# Patient Record
Sex: FEMALE | Race: WHITE | NOT HISPANIC OR LATINO | Employment: UNEMPLOYED | URBAN - METROPOLITAN AREA
[De-identification: names, ages, dates, MRNs, and addresses within clinical notes are randomized per-mention and may not be internally consistent; named-entity substitution may affect disease eponyms.]

---

## 2021-06-03 ENCOUNTER — RECORDS - HEALTHEAST (OUTPATIENT)
Dept: ADMINISTRATIVE | Facility: CLINIC | Age: 31
End: 2021-06-03

## 2022-03-05 ENCOUNTER — HOSPITAL ENCOUNTER (EMERGENCY)
Dept: HOSPITAL 47 - EC | Age: 32
LOS: 1 days | Discharge: HOME | End: 2022-03-06
Payer: COMMERCIAL

## 2022-03-05 VITALS — TEMPERATURE: 99.8 F

## 2022-03-05 DIAGNOSIS — S00.03XA: ICD-10-CM

## 2022-03-05 DIAGNOSIS — S00.83XA: ICD-10-CM

## 2022-03-05 DIAGNOSIS — F17.200: ICD-10-CM

## 2022-03-05 DIAGNOSIS — S62.102A: Primary | ICD-10-CM

## 2022-03-05 DIAGNOSIS — Z88.5: ICD-10-CM
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
## 2022-03-05 DIAGNOSIS — Z91.018: ICD-10-CM

## 2022-03-05 LAB
ALBUMIN SERPL-MCNC: 3.7 G/DL (ref 3.5–5)
ALP SERPL-CCNC: 103 U/L (ref 38–126)
ALT SERPL-CCNC: 51 U/L (ref 4–34)
ANION GAP SERPL CALC-SCNC: 10 MMOL/L
APTT BLD: 19.3 SEC (ref 22–30)
AST SERPL-CCNC: 45 U/L (ref 14–36)
BASOPHILS # BLD AUTO: 0 K/UL (ref 0–0.2)
BASOPHILS NFR BLD AUTO: 0 %
BUN SERPL-SCNC: 16 MG/DL (ref 7–17)
CALCIUM SPEC-MCNC: 9.1 MG/DL (ref 8.4–10.2)
CHLORIDE SERPL-SCNC: 104 MMOL/L (ref 98–107)
CO2 SERPL-SCNC: 26 MMOL/L (ref 22–30)
EOSINOPHIL # BLD AUTO: 0 K/UL (ref 0–0.7)
EOSINOPHIL NFR BLD AUTO: 0 %
ERYTHROCYTE [DISTWIDTH] IN BLOOD BY AUTOMATED COUNT: 4.53 M/UL (ref 3.8–5.4)
ERYTHROCYTE [DISTWIDTH] IN BLOOD: 13.6 % (ref 11.5–15.5)
GLUCOSE SERPL-MCNC: 114 MG/DL (ref 74–99)
HCT VFR BLD AUTO: 41.5 % (ref 34–46)
HGB BLD-MCNC: 14 GM/DL (ref 11.4–16)
INR PPP: 0.9 (ref ?–1.2)
LYMPHOCYTES # SPEC AUTO: 1.5 K/UL (ref 1–4.8)
LYMPHOCYTES NFR SPEC AUTO: 15 %
MCH RBC QN AUTO: 30.9 PG (ref 25–35)
MCHC RBC AUTO-ENTMCNC: 33.7 G/DL (ref 31–37)
MCV RBC AUTO: 91.7 FL (ref 80–100)
MONOCYTES # BLD AUTO: 0.3 K/UL (ref 0–1)
MONOCYTES NFR BLD AUTO: 3 %
NEUTROPHILS # BLD AUTO: 7.7 K/UL (ref 1.3–7.7)
NEUTROPHILS NFR BLD AUTO: 79 %
PLATELET # BLD AUTO: 371 K/UL (ref 150–450)
POTASSIUM SERPL-SCNC: 3.7 MMOL/L (ref 3.5–5.1)
PROT SERPL-MCNC: 7.1 G/DL (ref 6.3–8.2)
PT BLD: 10.1 SEC (ref 9–12)
SODIUM SERPL-SCNC: 140 MMOL/L (ref 137–145)
WBC # BLD AUTO: 9.7 K/UL (ref 3.8–10.6)

## 2022-03-05 PROCEDURE — 85730 THROMBOPLASTIN TIME PARTIAL: CPT

## 2022-03-05 PROCEDURE — 83605 ASSAY OF LACTIC ACID: CPT

## 2022-03-05 PROCEDURE — 85610 PROTHROMBIN TIME: CPT

## 2022-03-05 PROCEDURE — 96375 TX/PRO/DX INJ NEW DRUG ADDON: CPT

## 2022-03-05 PROCEDURE — 87040 BLOOD CULTURE FOR BACTERIA: CPT

## 2022-03-05 PROCEDURE — 71111 X-RAY EXAM RIBS/CHEST4/> VWS: CPT

## 2022-03-05 PROCEDURE — 85025 COMPLETE CBC W/AUTO DIFF WBC: CPT

## 2022-03-05 PROCEDURE — 80053 COMPREHEN METABOLIC PANEL: CPT

## 2022-03-05 PROCEDURE — 36415 COLL VENOUS BLD VENIPUNCTURE: CPT

## 2022-03-05 PROCEDURE — 99283 EMERGENCY DEPT VISIT LOW MDM: CPT

## 2022-03-05 PROCEDURE — 73110 X-RAY EXAM OF WRIST: CPT

## 2022-03-05 PROCEDURE — 73130 X-RAY EXAM OF HAND: CPT

## 2022-03-05 PROCEDURE — 29125 APPL SHORT ARM SPLINT STATIC: CPT

## 2022-03-05 PROCEDURE — 96374 THER/PROPH/DIAG INJ IV PUSH: CPT

## 2022-03-05 NOTE — XR
EXAMINATION TYPE: XR hand complete LT

 

DATE OF EXAM: 3/5/2022

 

COMPARISON: NONE

 

HISTORY: Pain

 

TECHNIQUE: 3 views

 

FINDINGS: 3 views are obtained through the cast that show transverse fractures of the mid shaft of th
e second and third metacarpals. This probably also nondisplaced fracture midshaft fourth metacarpal. 
It is 100% offset of the third metacarpal fracture. Detail limited by the cast. The fingers appear in
tact.

 

IMPRESSION: Multiple metacarpal fractures with variable displacement. The main exam.

## 2022-03-05 NOTE — XR
EXAMINATION TYPE: XR ribs bilat w pa chest xray

 

DATE OF EXAM: 3/5/2022

 

COMPARISON: NONE

 

HISTORY: Pain. Chest pain. Contusion.

 

TECHNIQUE: 9 views

 

FINDINGS: Heart and mediastinum are normal. Lungs are clear of infiltrate. There is no pleural effusi
on or pneumothorax. Trachea is midline.

 

The ribs appear intact. No evidence of rib fracture.

 

IMPRESSION: Normal chest. Normal bilateral rib exam.

## 2022-03-05 NOTE — XR
EXAMINATION TYPE: XR wrist complete LT

 

DATE OF EXAM: 3/5/2022

 

COMPARISON: NONE

 

HISTORY: Pain

 

TECHNIQUE: 3 views

 

FINDINGS: 3 views of the cast were obtained that show transverse fractures of mid shaft of the second
 third and fourth metacarpals. There is variable displacement up to 100% offset. The carpal bones are
 intact. There is nondisplaced ulnar styloid process fracture. Distal radius appears intact. Detail l
imited by the cast.

 

IMPRESSION: Ulnar styloid process fracture. Multiple metacarpal fractures. Limited exam.
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
## 2022-03-06 VITALS — DIASTOLIC BLOOD PRESSURE: 80 MMHG | SYSTOLIC BLOOD PRESSURE: 131 MMHG | HEART RATE: 85 BPM

## 2022-03-06 VITALS — RESPIRATION RATE: 18 BRPM

## 2023-11-07 ENCOUNTER — HOSPITAL ENCOUNTER (EMERGENCY)
Facility: HOSPITAL | Age: 33
Discharge: HOME | End: 2023-11-07
Attending: FAMILY MEDICINE | Admitting: FAMILY MEDICINE
Payer: COMMERCIAL

## 2023-11-07 VITALS
TEMPERATURE: 98.1 F | DIASTOLIC BLOOD PRESSURE: 76 MMHG | HEART RATE: 102 BPM | SYSTOLIC BLOOD PRESSURE: 138 MMHG | RESPIRATION RATE: 20 BRPM | OXYGEN SATURATION: 100 %

## 2023-11-07 DIAGNOSIS — L08.9 INFECTED INSECT BITE OF RIGHT LOWER EXTREMITY, INITIAL ENCOUNTER: ICD-10-CM

## 2023-11-07 DIAGNOSIS — S80.861A INFECTED INSECT BITE OF RIGHT LOWER EXTREMITY, INITIAL ENCOUNTER: ICD-10-CM
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
## 2023-11-07 PROCEDURE — 250N000011 HC RX IP 250 OP 636: Mod: JZ | Performed by: FAMILY MEDICINE

## 2023-11-07 PROCEDURE — 99284 EMERGENCY DEPT VISIT MOD MDM: CPT

## 2023-11-07 PROCEDURE — 250N000013 HC RX MED GY IP 250 OP 250 PS 637: Performed by: FAMILY MEDICINE

## 2023-11-07 PROCEDURE — 250N000009 HC RX 250: Performed by: FAMILY MEDICINE

## 2023-11-07 PROCEDURE — 96372 THER/PROPH/DIAG INJ SC/IM: CPT | Performed by: FAMILY MEDICINE

## 2023-11-07 RX ORDER — DOXYCYCLINE 100 MG/1
100 CAPSULE ORAL 2 TIMES DAILY
Qty: 14 CAPSULE | Refills: 0 | Status: SHIPPED | OUTPATIENT
Start: 2023-11-07

## 2023-11-07 RX ORDER — DOXYCYCLINE 100 MG/1
100 CAPSULE ORAL ONCE
Qty: 1 CAPSULE | Refills: 0 | Status: COMPLETED | OUTPATIENT
Start: 2023-11-07 | End: 2023-11-07

## 2023-11-07 RX ADMIN — LIDOCAINE HYDROCHLORIDE 1 G: 10 INJECTION, SOLUTION INFILTRATION; PERINEURAL at 20:32

## 2023-11-07 RX ADMIN — DOXYCYCLINE 100 MG: 100 CAPSULE ORAL at 19:59

## 2023-11-07 ASSESSMENT — ENCOUNTER SYMPTOMS
FEVER: 0
WOUND: 1

## 2023-11-08 NOTE — ED TRIAGE NOTES
Pt states right leg swelling and blister progressing for 3 days after, what pt believes is from a spider bite.  Pain radiates from right knee up her leg.  Pt states ibuprofen at about 1700.

## 2023-11-08 NOTE — ED PROVIDER NOTES
EMERGENCY DEPARTMENT ENCOUNTER      NAME: Cynthia Ramesh  AGE: 32 year old female  YOB: 1990  MRN: [id number]  EVALUATION DATE & TIME: 11/7/2023  7:22 PM    PCP: Luther Benitez    ED PROVIDER: Brian Vaughn M.D.    Chief Complaint   Patient presents with    Insect Bite    Leg Swelling       FINAL IMPRESSION:  1. Infected insect bite of right lower extremity, initial encounter        ED COURSE & MEDICAL DECISION MAKING:    Pertinent Labs & Imaging studies independently interpreted by me. (See chart for details)  7:24 PM  Patient seen and examined, reviewed most recent emergency department evaluation in June 2023 when patient was seen with an unintentional opiate overdose in context of fentanyl abuse.  Presents today with an area of induration, erythema, and tenderness of the right anterior lateral lower leg.  This is distant from the joint and there is no joint effusion, pain with passive movement of the knee, swelling or redness of the bursa.  This is most consistent with cellulitis, possibly infected insect envenomation.  No central necrosis or eschar.  Discussed wound care, patient will be started on Augmentin and doxycycline, follow-up with primary care.    At the conclusion of the encounter I discussed the results of all of the tests and the disposition. The questions were answered. The patient or family acknowledged understanding and was agreeable with the care plan.     Medical Decision Making    History:  Supplemental history from: Documented in chart, if applicable  External Record(s) reviewed: Documented in chart, if applicable.    Work Up:  Chart documentation includes differential considered and any EKGs or imaging independently interpreted by provider, where specified.  In additional to work up documented, I considered the following work up: Documented in chart, if applicable.    External consultation:  Discussion of management with another provider: Documented in  chart, if applicable    Complicating factors:  Care impacted by chronic illness: Smoking / Nicotine Use  Care affected by social determinants of health: N/A    Disposition considerations: Discharge. I prescribed additional prescription strength medication(s) as charted. See documentation for any additional details.      MEDICATIONS GIVEN IN THE EMERGENCY:  Medications   cefTRIAXone (ROCEPHIN) 1 g in lidocaine injection (has no administration in time range)   doxycycline monohydrate (MONODOX) capsule 100 mg (100 mg Oral $Given 11/7/23 1959)       NEW PRESCRIPTIONS STARTED AT TODAY'S ER VISIT  New Prescriptions    AMOXICILLIN-CLAVULANATE (AUGMENTIN) 875-125 MG TABLET    Take 1 tablet by mouth 2 times daily    DOXYCYCLINE HYCLATE (VIBRAMYCIN) 100 MG CAPSULE    Take 1 capsule (100 mg) by mouth 2 times daily       =================================================================    HPI    Patient information was obtained from: patient       Cynthia Ramesh is a 32 year old female with a pertinent history of smoking who presents to this ED via personal vehicle for evaluation of insect bite.     Patient reports a spider bit her right lower leg 2 days ago. The area was intially started with just a blister and has increased in redness and warmth. She has used triple antibiotic ointment and ibuprofen. Denies fever.     Denies pregnancy concern.     Patient is a smoker, but denies alcohol use.       REVIEW OF SYSTEMS   Review of Systems   Constitutional:  Negative for fever.   Skin:  Positive for wound (right lower leg).      All other systems reviewed and negative    PAST MEDICAL HISTORY:  Past Medical History:   Diagnosis Date    Normal delivery x2       PAST SURGICAL HISTORY:  Past Surgical History:   Procedure Laterality Date    CHOLECYSTECTOMY      DILATION AND CURETTAGE  7/15/2011    Procedure:DILATION AND CURETTAGE; manual removal of retained placenta; Surgeon:AUSTIN BLAIR; Location:[place] OR    [facility]  GALLBLADDER      Description: Cholecystectomy;  Proc Date: 01/01/2009;    OTHER SURGICAL HISTORY      bilateral feet x 3 total       CURRENT MEDICATIONS:    Current Facility-Administered Medications   Medication    cefTRIAXone (ROCEPHIN) 1 g in lidocaine injection     Current Outpatient Medications   Medication    amoxicillin-clavulanate (AUGMENTIN) 875-125 MG tablet    doxycycline hyclate (VIBRAMYCIN) 100 MG capsule    acetaminophen (TYLENOL) 325 MG tablet       ALLERGIES:  Allergies   Allergen Reactions    Pineapple Anaphylaxis    Hydrocodone Itching       FAMILY HISTORY:  Family History   Problem Relation Age of Onset    Family History Negative No family hx of         for anesthesia problems or bleeding dyscrasias       SOCIAL HISTORY:   Social History     Socioeconomic History    Marital status: Single   Substance and Sexual Activity    Alcohol use: No    Drug use: No     Comment: h/o THC, +UTox in pregnancy    Sexual activity: Yes     Partners: Male       VITALS:  /80   Pulse 95   Temp 98.1  F (36.7  C) (Oral)   Resp 20   SpO2 99%     PHYSICAL EXAM:  Physical Exam  Vitals and nursing note reviewed.   Constitutional:       Appearance: Normal appearance.   HENT:      Head: Normocephalic and atraumatic.      Right Ear: External ear normal.      Left Ear: External ear normal.      Nose: Nose normal.      Mouth/Throat:      Mouth: Mucous membranes are moist.   Eyes:      Extraocular Movements: Extraocular movements intact.      Conjunctiva/sclera: Conjunctivae normal.      Pupils: Pupils are equal, round, and reactive to light.   Cardiovascular:      Rate and Rhythm: Normal rate and regular rhythm.   Pulmonary:      Effort: Pulmonary effort is normal.      Breath sounds: Normal breath sounds. No wheezing or rales.   Abdominal:      General: Abdomen is flat. There is no distension.      Palpations: Abdomen is soft.      Tenderness: There is no abdominal tenderness. There is no guarding.   Musculoskeletal:          General: Normal range of motion.      Cervical back: Normal range of motion and neck supple.      Right lower leg: No edema.      Left lower leg: No edema.   Lymphadenopathy:      Cervical: No cervical adenopathy.   Skin:     General: Skin is warm and dry.      Findings: Erythema (8 cm on right anterior lateral proximal lower leg with induration and warmth) present.   Neurological:      General: No focal deficit present.      Mental Status: She is alert and oriented to person, place, and time. Mental status is at baseline.      Comments: No gross focal neurologic deficits   Psychiatric:         Mood and Affect: Mood normal.         Behavior: Behavior normal.         Thought Content: Thought content normal.       I, Maru Arcos, am serving as a scribe to document services personally performed by Dr. Vaughn based on my observation and the provider's statements to me. I, Brian Vaughn MD attest that Maru Arcos is acting in a scribe capacity, has observed my performance of the services and has documented them in accordance with my direction.    Brian Vaughn M.D.  Emergency Medicine  [facility] EMERGENCY DEPARTMENT  [address] [place] [id number]  [phone]  Dept: [phone]       Brian Vaughn MD  11/07/23 2013

## 2023-11-08 NOTE — DISCHARGE INSTRUCTIONS
Take antibiotics as prescribed    Primary care clinic will schedule follow-up appointment with you

## 2023-11-09 ENCOUNTER — HOSPITAL ENCOUNTER (EMERGENCY)
Facility: HOSPITAL | Age: 33
Discharge: HOME | End: 2023-11-09
Attending: EMERGENCY MEDICINE | Admitting: EMERGENCY MEDICINE
Payer: COMMERCIAL

## 2023-11-09 VITALS
TEMPERATURE: 98.3 F | HEART RATE: 91 BPM | WEIGHT: 170 LBS | OXYGEN SATURATION: 99 % | DIASTOLIC BLOOD PRESSURE: 87 MMHG | BODY MASS INDEX: 30.12 KG/M2 | SYSTOLIC BLOOD PRESSURE: 136 MMHG | RESPIRATION RATE: 16 BRPM | HEIGHT: 63 IN

## 2023-11-09 DIAGNOSIS — L02.415 ABSCESS OF RIGHT LOWER EXTREMITY: ICD-10-CM

## 2023-11-09 PROCEDURE — 27603 DRAIN LOWER LEG LESION: CPT | Mod: RT

## 2023-11-09 PROCEDURE — 99283 EMERGENCY DEPT VISIT LOW MDM: CPT

## 2023-11-09 ASSESSMENT — ACTIVITIES OF DAILY LIVING (ADL): ADLS_ACUITY_SCORE: 33

## 2023-11-10 NOTE — DISCHARGE INSTRUCTIONS
You were seen in the emergency department for cellulitis and abscess of your right leg.  We performed an incision and drainage procedure and placed some packing in the wound of your right leg.  We would suggest following up in 3 to 4 days to have someone check the wound and likely remove the packing.  You can continue on the antibiotics previously prescribed.  He can follow-up with a primary clinic using the information above or any urgent care for your packing removal in a few days.  If you notice severe increase in redness extending up the leg, fever, or other immediate concern we can reevaluate as needed in the emergency department.

## 2023-11-11 NOTE — ED PROVIDER NOTES
EMERGENCY DEPARTMENT ENCOUNTER      NAME: Cynthia Ramesh  AGE: 32 year old female  YOB: 1990  MRN: [id number]  EVALUATION DATE & TIME: 2023  7:59 PM    PCP: No Ref-Primary, Physician    ED PROVIDER: Carlos Manuel Chavez M.D.      Chief Complaint   Patient presents with    Wound Check         FINAL IMPRESSION:  1. Abscess of right lower extremity          ED COURSE & MEDICAL DECISION MAKIN year old female presents to the Emergency Department for evaluation of right leg infection.  Patient seen for a patch of cellulitis on her right lower extremity and started on Augmentin and doxycycline a couple of days ago.  Now has developed a about 1.5 cm area of central fluctuance and abscess.  Patient was consented for an incision and drainage which was completed with a moderate amount of purulent output.  She tolerated this well.  Some packing was left in place.  Follow-up with clinic/urgent care was advised in 3 to 4 days.  I think with the abscess cavity now open and draining there would need to be any change to her antibiotic regimen.  No indication for other lab or imaging evaluation at this time.  Patient was in agreement with this plan and discharged in stable condition.    At the conclusion of the encounter I discussed the results of all of the tests and the disposition. The questions were answered. The patient or family acknowledged understanding and was agreeable with the care plan.       Medical Decision Making    History:  Supplemental history from: Documented in chart, if applicable  External Record(s) reviewed: Documented in chart, if applicable.    Work Up:  Chart documentation includes differential considered and any EKGs or imaging independently interpreted by provider, where specified.  In additional to work up documented, I considered the following work up: Documented in chart, if applicable.    External consultation:  Discussion of management with another provider: Documented in  chart, if applicable    Complicating factors:  Care impacted by chronic illness: N/A  Care affected by social determinants of health: N/A    Disposition considerations: Discharge. I recommended the patient continue their current prescription strength medication(s): Doxycycline, Augmentin. N/A.            MEDICATIONS GIVEN IN THE EMERGENCY:  Medications - No data to display    NEW PRESCRIPTIONS STARTED AT TODAY'S ER VISIT  Discharge Medication List as of 11/9/2023  9:28 PM             =================================================================    HPI    Patient information was obtained from: Patient        Cynthia Ramesh is a 32 year old female who presents to this ED today for evaluation of right leg infection.  Patient seen in the emergency department a couple of days ago for what was described as a possible spider bite to her right lower extremity.  She was started on antibiotics at that time.  She says that the redness has continued to increase slightly since that visit despite taking antibiotics for a couple of days.  The center of the area has also started to drain some liquid and become a bit more swollen.  She denies fever or other constitutional symptoms.      REVIEW OF SYSTEMS   All systems reviewed and negative except as noted in HPI.    PAST MEDICAL HISTORY:  Past Medical History:   Diagnosis Date    Normal delivery x2       PAST SURGICAL HISTORY:  Past Surgical History:   Procedure Laterality Date    CHOLECYSTECTOMY      DILATION AND CURETTAGE  7/15/2011    Procedure:DILATION AND CURETTAGE; manual removal of retained placenta; Surgeon:AUSTIN BLAIR; Location:[facility]     REMOVAL GALLBLADDER      Description: Cholecystectomy;  Proc Date: 01/01/2009;    OTHER SURGICAL HISTORY      bilateral feet x 3 total           CURRENT MEDICATIONS:    No current facility-administered medications for this encounter.     Current Outpatient Medications   Medication    acetaminophen (TYLENOL) 325 MG tablet     "amoxicillin-clavulanate (AUGMENTIN) 875-125 MG tablet    doxycycline hyclate (VIBRAMYCIN) 100 MG capsule         ALLERGIES:  Allergies   Allergen Reactions    Pineapple Anaphylaxis    Hydrocodone Itching       FAMILY HISTORY:  Family History   Problem Relation Age of Onset    Family History Negative No family hx of         for anesthesia problems or bleeding dyscrasias       SOCIAL HISTORY:   Social History     Socioeconomic History    Marital status: Single   Substance and Sexual Activity    Alcohol use: No    Drug use: No     Comment: h/o THC, +UTox in pregnancy    Sexual activity: Yes     Partners: Male       VITALS:  /87   Pulse 91   Temp 98.3  F (36.8  C)   Resp 16   Ht 1.6 m (5' 3\")   Wt 77.1 kg (170 lb)   SpO2 99%   BMI 30.11 kg/m      PHYSICAL EXAM    Constitutional: Well developed, Well nourished, NAD.  HENT: Normocephalic, Atraumatic. Neck Supple.  Eyes: EOMI, Conjunctiva normal.  Respiratory: Breathing comfortably on room air. Speaks full sentences easily. Lungs clear to ascultation.  Cardiovascular: Normal heart rate, Regular rhythm. No peripheral edema.  Abdomen: Soft, nontender  Musculoskeletal: There is a patch of cellulitic change with a central area of fluctuance about 1.5 cm to the right proximal shin.  The center of this is weeping some purulent fluid.  Normal range of motion at the knee and ankle on the affected leg.  Integument: Warm, Dry.  Neurologic: Alert & awake, Normal motor function, Normal sensory function, No focal deficits noted.   Psychiatric: Cooperative. Affect appropriate.        PROCEDURES:   PROCEDURE: Incision and Drainage   INDICATIONS: Localized abscess   PROCEDURE PROVIDER: Dr Carlos Chavez   SITE: Right lower leg   MEDICATION: 6 mLs of 1% Lidocaine with epinephrine   NOTE: The area was prepped with chlorhexidine and draped off in the usual sterile fashion.  Local anesthetic was injected subcutaneously with anesthesia effects demonstrated prior to proceeding.  The " area of maximal fluctuance was opened with a # 11 Blade ([place]) using a Single Straight incision to allow for drainage.  The abscess was drained.  The abscess cavity was bluntly explored to separate any loculations. Iodoform Gauze was placed into the abscess cavity.  A sterile dressing was placed over the area.   COMPLEXITY: Complex     COMPLICATIONS: Patient tolerated procedure well, without complication           Carlos Manuel Chavez M.D.  Emergency Medicine  [facility] EMERGENCY DEPARTMENT  [address] [place] [id number] [phone]  Dept: [phone]       Carlos Manuel Chavez MD  11/10/23 [id number]